# Patient Record
Sex: FEMALE | Race: OTHER | HISPANIC OR LATINO | URBAN - METROPOLITAN AREA
[De-identification: names, ages, dates, MRNs, and addresses within clinical notes are randomized per-mention and may not be internally consistent; named-entity substitution may affect disease eponyms.]

---

## 2020-02-07 ENCOUNTER — EMERGENCY (EMERGENCY)
Facility: HOSPITAL | Age: 30
LOS: 0 days | Discharge: HOME | End: 2020-02-07
Attending: EMERGENCY MEDICINE
Payer: COMMERCIAL

## 2020-02-07 VITALS — HEIGHT: 64 IN | WEIGHT: 160.06 LBS

## 2020-02-07 VITALS
SYSTOLIC BLOOD PRESSURE: 110 MMHG | RESPIRATION RATE: 18 BRPM | OXYGEN SATURATION: 99 % | TEMPERATURE: 98 F | HEART RATE: 65 BPM | DIASTOLIC BLOOD PRESSURE: 64 MMHG

## 2020-02-07 DIAGNOSIS — R11.10 VOMITING, UNSPECIFIED: ICD-10-CM

## 2020-02-07 DIAGNOSIS — R10.13 EPIGASTRIC PAIN: ICD-10-CM

## 2020-02-07 DIAGNOSIS — R11.2 NAUSEA WITH VOMITING, UNSPECIFIED: ICD-10-CM

## 2020-02-07 LAB
ALBUMIN SERPL ELPH-MCNC: 4.2 G/DL — SIGNIFICANT CHANGE UP (ref 3.3–5)
ALP SERPL-CCNC: 66 U/L — SIGNIFICANT CHANGE UP (ref 40–120)
ALT FLD-CCNC: 21 U/L — SIGNIFICANT CHANGE UP (ref 12–78)
ANION GAP SERPL CALC-SCNC: 7 MMOL/L — SIGNIFICANT CHANGE UP (ref 5–17)
AST SERPL-CCNC: 16 U/L — SIGNIFICANT CHANGE UP (ref 15–37)
BASOPHILS # BLD AUTO: 0.04 K/UL — SIGNIFICANT CHANGE UP (ref 0–0.2)
BASOPHILS NFR BLD AUTO: 0.4 % — SIGNIFICANT CHANGE UP (ref 0–2)
BILIRUB SERPL-MCNC: 0.3 MG/DL — SIGNIFICANT CHANGE UP (ref 0.2–1.2)
BUN SERPL-MCNC: 15 MG/DL — SIGNIFICANT CHANGE UP (ref 7–23)
CALCIUM SERPL-MCNC: 9.2 MG/DL — SIGNIFICANT CHANGE UP (ref 8.5–10.1)
CHLORIDE SERPL-SCNC: 107 MMOL/L — SIGNIFICANT CHANGE UP (ref 96–108)
CO2 SERPL-SCNC: 24 MMOL/L — SIGNIFICANT CHANGE UP (ref 22–31)
CREAT SERPL-MCNC: 0.93 MG/DL — SIGNIFICANT CHANGE UP (ref 0.5–1.3)
EOSINOPHIL # BLD AUTO: 0.27 K/UL — SIGNIFICANT CHANGE UP (ref 0–0.5)
EOSINOPHIL NFR BLD AUTO: 3 % — SIGNIFICANT CHANGE UP (ref 0–6)
GLUCOSE SERPL-MCNC: 78 MG/DL — SIGNIFICANT CHANGE UP (ref 70–99)
HCT VFR BLD CALC: 39 % — SIGNIFICANT CHANGE UP (ref 34.5–45)
HGB BLD-MCNC: 12.7 G/DL — SIGNIFICANT CHANGE UP (ref 11.5–15.5)
IMM GRANULOCYTES NFR BLD AUTO: 0.2 % — SIGNIFICANT CHANGE UP (ref 0–1.5)
LIDOCAIN IGE QN: 78 U/L — SIGNIFICANT CHANGE UP (ref 73–393)
LYMPHOCYTES # BLD AUTO: 3.69 K/UL — HIGH (ref 1–3.3)
LYMPHOCYTES # BLD AUTO: 40.3 % — SIGNIFICANT CHANGE UP (ref 13–44)
MCHC RBC-ENTMCNC: 29.1 PG — SIGNIFICANT CHANGE UP (ref 27–34)
MCHC RBC-ENTMCNC: 32.6 GM/DL — SIGNIFICANT CHANGE UP (ref 32–36)
MCV RBC AUTO: 89.4 FL — SIGNIFICANT CHANGE UP (ref 80–100)
MONOCYTES # BLD AUTO: 0.57 K/UL — SIGNIFICANT CHANGE UP (ref 0–0.9)
MONOCYTES NFR BLD AUTO: 6.2 % — SIGNIFICANT CHANGE UP (ref 2–14)
NEUTROPHILS # BLD AUTO: 4.56 K/UL — SIGNIFICANT CHANGE UP (ref 1.8–7.4)
NEUTROPHILS NFR BLD AUTO: 49.9 % — SIGNIFICANT CHANGE UP (ref 43–77)
PLATELET # BLD AUTO: 257 K/UL — SIGNIFICANT CHANGE UP (ref 150–400)
POTASSIUM SERPL-MCNC: 3.7 MMOL/L — SIGNIFICANT CHANGE UP (ref 3.5–5.3)
POTASSIUM SERPL-SCNC: 3.7 MMOL/L — SIGNIFICANT CHANGE UP (ref 3.5–5.3)
PROT SERPL-MCNC: 8 GM/DL — SIGNIFICANT CHANGE UP (ref 6–8.3)
RBC # BLD: 4.36 M/UL — SIGNIFICANT CHANGE UP (ref 3.8–5.2)
RBC # FLD: 13.5 % — SIGNIFICANT CHANGE UP (ref 10.3–14.5)
SODIUM SERPL-SCNC: 138 MMOL/L — SIGNIFICANT CHANGE UP (ref 135–145)
WBC # BLD: 9.15 K/UL — SIGNIFICANT CHANGE UP (ref 3.8–10.5)
WBC # FLD AUTO: 9.15 K/UL — SIGNIFICANT CHANGE UP (ref 3.8–10.5)

## 2020-02-07 PROCEDURE — 80053 COMPREHEN METABOLIC PANEL: CPT

## 2020-02-07 PROCEDURE — 99284 EMERGENCY DEPT VISIT MOD MDM: CPT | Mod: 25

## 2020-02-07 PROCEDURE — 96361 HYDRATE IV INFUSION ADD-ON: CPT

## 2020-02-07 PROCEDURE — 85025 COMPLETE CBC W/AUTO DIFF WBC: CPT

## 2020-02-07 PROCEDURE — 36415 COLL VENOUS BLD VENIPUNCTURE: CPT

## 2020-02-07 PROCEDURE — 96374 THER/PROPH/DIAG INJ IV PUSH: CPT

## 2020-02-07 PROCEDURE — 83690 ASSAY OF LIPASE: CPT

## 2020-02-07 PROCEDURE — 96375 TX/PRO/DX INJ NEW DRUG ADDON: CPT

## 2020-02-07 PROCEDURE — 99284 EMERGENCY DEPT VISIT MOD MDM: CPT

## 2020-02-07 RX ORDER — FAMOTIDINE 10 MG/ML
20 INJECTION INTRAVENOUS ONCE
Refills: 0 | Status: COMPLETED | OUTPATIENT
Start: 2020-02-07 | End: 2020-02-07

## 2020-02-07 RX ORDER — FAMOTIDINE 10 MG/ML
1 INJECTION INTRAVENOUS
Qty: 14 | Refills: 0
Start: 2020-02-07 | End: 2020-02-13

## 2020-02-07 RX ORDER — ONDANSETRON 8 MG/1
1 TABLET, FILM COATED ORAL
Qty: 9 | Refills: 0
Start: 2020-02-07 | End: 2020-02-09

## 2020-02-07 RX ORDER — ONDANSETRON 8 MG/1
4 TABLET, FILM COATED ORAL ONCE
Refills: 0 | Status: COMPLETED | OUTPATIENT
Start: 2020-02-07 | End: 2020-02-07

## 2020-02-07 RX ORDER — SODIUM CHLORIDE 9 MG/ML
1000 INJECTION INTRAMUSCULAR; INTRAVENOUS; SUBCUTANEOUS ONCE
Refills: 0 | Status: COMPLETED | OUTPATIENT
Start: 2020-02-07 | End: 2020-02-07

## 2020-02-07 RX ADMIN — ONDANSETRON 4 MILLIGRAM(S): 8 TABLET, FILM COATED ORAL at 20:01

## 2020-02-07 RX ADMIN — FAMOTIDINE 20 MILLIGRAM(S): 10 INJECTION INTRAVENOUS at 20:01

## 2020-02-07 RX ADMIN — SODIUM CHLORIDE 1000 MILLILITER(S): 9 INJECTION INTRAMUSCULAR; INTRAVENOUS; SUBCUTANEOUS at 19:22

## 2020-02-07 RX ADMIN — SODIUM CHLORIDE 1000 MILLILITER(S): 9 INJECTION INTRAMUSCULAR; INTRAVENOUS; SUBCUTANEOUS at 18:22

## 2020-02-07 NOTE — ED STATDOCS - PROGRESS NOTE DETAILS
29 y/o Female presents to ED c/o abd pains, vomiting today.  On exam, mild epigastric / RUQ tenderness to palp.  Neg rebound or guarding.  Will F/U Labs, re-eval. Britney Rossi PA-C Labs WNL.  Pt feeling better. Active Bs x4, Soft, NT/ND.  Will dc home with Pepcid, Zofran.  F/U with Phil. Britney Rossi PA-C

## 2020-02-07 NOTE — ED STATDOCS - PATIENT PORTAL LINK FT
You can access the FollowMyHealth Patient Portal offered by [facility] by registering at the following website: [email or web address]. By joining [employer]’s FollowMyHealth portal, you will also be able to view your health information using other applications (apps) compatible with our system.

## 2020-02-07 NOTE — ED STATDOCS - OBJECTIVE STATEMENT
29 y/o female with no pertinent PMHx, presents to the ED c/o vomiting, nausea, upper abd pain beginning today. Abd pain is exacerbated while vomiting. Denies fever or diarrhea. Patient primary language is Libyan. No other complaints at this time.

## 2020-02-07 NOTE — ED STATDOCS - CLINICAL SUMMARY MEDICAL DECISION MAKING FREE TEXT BOX
Pt with likely viral gastritis.  Treated symptomatically with improvement.  D/c home in good condition.

## 2020-02-07 NOTE — ED ADULT NURSE NOTE - OBJECTIVE STATEMENT
pt a&ox3, ambulatory. vss. complains of 5-6/10 abdominal pain associated w/ nausea and vomiting. denies fever, diarrhea, chills, chest pain and sob. denies difficulty and burning w/ urination. no distress. will monitor.

## 2020-05-22 PROBLEM — Z78.9 OTHER SPECIFIED HEALTH STATUS: Chronic | Status: ACTIVE | Noted: 2020-02-08

## 2020-05-26 ENCOUNTER — APPOINTMENT (OUTPATIENT)
Dept: ANTEPARTUM | Facility: CLINIC | Age: 30
End: 2020-05-26
Payer: COMMERCIAL

## 2020-05-26 ENCOUNTER — ASOB RESULT (OUTPATIENT)
Age: 30
End: 2020-05-26

## 2020-05-26 PROBLEM — Z00.00 ENCOUNTER FOR PREVENTIVE HEALTH EXAMINATION: Status: ACTIVE | Noted: 2020-05-26

## 2020-05-26 PROCEDURE — 76856 US EXAM PELVIC COMPLETE: CPT | Mod: 59

## 2020-05-26 PROCEDURE — 76830 TRANSVAGINAL US NON-OB: CPT

## 2020-10-08 ENCOUNTER — APPOINTMENT (OUTPATIENT)
Dept: ULTRASOUND IMAGING | Facility: CLINIC | Age: 30
End: 2020-10-08

## 2020-12-27 ENCOUNTER — OUTPATIENT (OUTPATIENT)
Dept: OUTPATIENT SERVICES | Facility: HOSPITAL | Age: 30
LOS: 1 days | End: 2020-12-27
Payer: COMMERCIAL

## 2020-12-27 ENCOUNTER — APPOINTMENT (OUTPATIENT)
Dept: ULTRASOUND IMAGING | Facility: CLINIC | Age: 30
End: 2020-12-27
Payer: COMMERCIAL

## 2020-12-27 DIAGNOSIS — Z00.8 ENCOUNTER FOR OTHER GENERAL EXAMINATION: ICD-10-CM

## 2020-12-27 PROCEDURE — 76700 US EXAM ABDOM COMPLETE: CPT

## 2020-12-27 PROCEDURE — 76700 US EXAM ABDOM COMPLETE: CPT | Mod: 26

## 2021-03-29 ENCOUNTER — EMERGENCY (EMERGENCY)
Facility: HOSPITAL | Age: 31
LOS: 0 days | Discharge: HOME | End: 2021-03-29
Attending: EMERGENCY MEDICINE
Payer: COMMERCIAL

## 2021-03-29 VITALS
RESPIRATION RATE: 18 BRPM | TEMPERATURE: 99 F | SYSTOLIC BLOOD PRESSURE: 128 MMHG | OXYGEN SATURATION: 100 % | DIASTOLIC BLOOD PRESSURE: 78 MMHG | HEART RATE: 90 BPM

## 2021-03-29 VITALS
SYSTOLIC BLOOD PRESSURE: 122 MMHG | RESPIRATION RATE: 19 BRPM | OXYGEN SATURATION: 100 % | DIASTOLIC BLOOD PRESSURE: 80 MMHG | TEMPERATURE: 99 F | HEART RATE: 94 BPM

## 2021-03-29 DIAGNOSIS — R51.9 HEADACHE, UNSPECIFIED: ICD-10-CM

## 2021-03-29 DIAGNOSIS — R11.0 NAUSEA: ICD-10-CM

## 2021-03-29 DIAGNOSIS — Z11.3 ENCOUNTER FOR SCREENING FOR INFECTIONS WITH A PREDOMINANTLY SEXUAL MODE OF TRANSMISSION: ICD-10-CM

## 2021-03-29 DIAGNOSIS — Z11.4 ENCOUNTER FOR SCREENING FOR HUMAN IMMUNODEFICIENCY VIRUS [HIV]: ICD-10-CM

## 2021-03-29 DIAGNOSIS — R10.9 UNSPECIFIED ABDOMINAL PAIN: ICD-10-CM

## 2021-03-29 DIAGNOSIS — K29.70 GASTRITIS, UNSPECIFIED, WITHOUT BLEEDING: ICD-10-CM

## 2021-03-29 LAB
ALBUMIN SERPL ELPH-MCNC: 4.5 G/DL — SIGNIFICANT CHANGE UP (ref 3.3–5)
ALP SERPL-CCNC: 71 U/L — SIGNIFICANT CHANGE UP (ref 40–120)
ALT FLD-CCNC: 20 U/L — SIGNIFICANT CHANGE UP (ref 12–78)
ANION GAP SERPL CALC-SCNC: 5 MMOL/L — SIGNIFICANT CHANGE UP (ref 5–17)
APPEARANCE UR: CLEAR — SIGNIFICANT CHANGE UP
AST SERPL-CCNC: 10 U/L — LOW (ref 15–37)
BASOPHILS # BLD AUTO: 0.03 K/UL — SIGNIFICANT CHANGE UP (ref 0–0.2)
BASOPHILS NFR BLD AUTO: 0.3 % — SIGNIFICANT CHANGE UP (ref 0–2)
BILIRUB SERPL-MCNC: 0.4 MG/DL — SIGNIFICANT CHANGE UP (ref 0.2–1.2)
BILIRUB UR-MCNC: NEGATIVE — SIGNIFICANT CHANGE UP
BUN SERPL-MCNC: 12 MG/DL — SIGNIFICANT CHANGE UP (ref 7–23)
CALCIUM SERPL-MCNC: 9.6 MG/DL — SIGNIFICANT CHANGE UP (ref 8.5–10.1)
CHLORIDE SERPL-SCNC: 105 MMOL/L — SIGNIFICANT CHANGE UP (ref 96–108)
CO2 SERPL-SCNC: 28 MMOL/L — SIGNIFICANT CHANGE UP (ref 22–31)
COLOR SPEC: YELLOW — SIGNIFICANT CHANGE UP
CREAT SERPL-MCNC: 0.89 MG/DL — SIGNIFICANT CHANGE UP (ref 0.5–1.3)
DIFF PNL FLD: NEGATIVE — SIGNIFICANT CHANGE UP
EOSINOPHIL # BLD AUTO: 0.03 K/UL — SIGNIFICANT CHANGE UP (ref 0–0.5)
EOSINOPHIL NFR BLD AUTO: 0.3 % — SIGNIFICANT CHANGE UP (ref 0–6)
GLUCOSE SERPL-MCNC: 103 MG/DL — HIGH (ref 70–99)
GLUCOSE UR QL: NEGATIVE MG/DL — SIGNIFICANT CHANGE UP
HCT VFR BLD CALC: 40.1 % — SIGNIFICANT CHANGE UP (ref 34.5–45)
HGB BLD-MCNC: 13 G/DL — SIGNIFICANT CHANGE UP (ref 11.5–15.5)
HIV 1 & 2 AB SERPL IA.RAPID: SIGNIFICANT CHANGE UP
IMM GRANULOCYTES NFR BLD AUTO: 0.3 % — SIGNIFICANT CHANGE UP (ref 0–1.5)
KETONES UR-MCNC: NEGATIVE — SIGNIFICANT CHANGE UP
LEUKOCYTE ESTERASE UR-ACNC: NEGATIVE — SIGNIFICANT CHANGE UP
LIDOCAIN IGE QN: 70 U/L — LOW (ref 73–393)
LYMPHOCYTES # BLD AUTO: 2.14 K/UL — SIGNIFICANT CHANGE UP (ref 1–3.3)
LYMPHOCYTES # BLD AUTO: 22.9 % — SIGNIFICANT CHANGE UP (ref 13–44)
MCHC RBC-ENTMCNC: 29.1 PG — SIGNIFICANT CHANGE UP (ref 27–34)
MCHC RBC-ENTMCNC: 32.4 GM/DL — SIGNIFICANT CHANGE UP (ref 32–36)
MCV RBC AUTO: 89.9 FL — SIGNIFICANT CHANGE UP (ref 80–100)
MONOCYTES # BLD AUTO: 0.39 K/UL — SIGNIFICANT CHANGE UP (ref 0–0.9)
MONOCYTES NFR BLD AUTO: 4.2 % — SIGNIFICANT CHANGE UP (ref 2–14)
NEUTROPHILS # BLD AUTO: 6.72 K/UL — SIGNIFICANT CHANGE UP (ref 1.8–7.4)
NEUTROPHILS NFR BLD AUTO: 72 % — SIGNIFICANT CHANGE UP (ref 43–77)
NITRITE UR-MCNC: NEGATIVE — SIGNIFICANT CHANGE UP
PH UR: 7 — SIGNIFICANT CHANGE UP (ref 5–8)
PLATELET # BLD AUTO: 259 K/UL — SIGNIFICANT CHANGE UP (ref 150–400)
POTASSIUM SERPL-MCNC: 3.6 MMOL/L — SIGNIFICANT CHANGE UP (ref 3.5–5.3)
POTASSIUM SERPL-SCNC: 3.6 MMOL/L — SIGNIFICANT CHANGE UP (ref 3.5–5.3)
PROT SERPL-MCNC: 8.3 GM/DL — SIGNIFICANT CHANGE UP (ref 6–8.3)
PROT UR-MCNC: NEGATIVE MG/DL — SIGNIFICANT CHANGE UP
RBC # BLD: 4.46 M/UL — SIGNIFICANT CHANGE UP (ref 3.8–5.2)
RBC # FLD: 13.3 % — SIGNIFICANT CHANGE UP (ref 10.3–14.5)
SODIUM SERPL-SCNC: 138 MMOL/L — SIGNIFICANT CHANGE UP (ref 135–145)
SP GR SPEC: 1 — LOW (ref 1.01–1.02)
UROBILINOGEN FLD QL: NEGATIVE MG/DL — SIGNIFICANT CHANGE UP
WBC # BLD: 9.34 K/UL — SIGNIFICANT CHANGE UP (ref 3.8–10.5)
WBC # FLD AUTO: 9.34 K/UL — SIGNIFICANT CHANGE UP (ref 3.8–10.5)

## 2021-03-29 PROCEDURE — 86780 TREPONEMA PALLIDUM: CPT

## 2021-03-29 PROCEDURE — 99284 EMERGENCY DEPT VISIT MOD MDM: CPT

## 2021-03-29 PROCEDURE — 83690 ASSAY OF LIPASE: CPT

## 2021-03-29 PROCEDURE — 81025 URINE PREGNANCY TEST: CPT

## 2021-03-29 PROCEDURE — 96375 TX/PRO/DX INJ NEW DRUG ADDON: CPT

## 2021-03-29 PROCEDURE — 86703 HIV-1/HIV-2 1 RESULT ANTBDY: CPT

## 2021-03-29 PROCEDURE — 96374 THER/PROPH/DIAG INJ IV PUSH: CPT

## 2021-03-29 PROCEDURE — 80053 COMPREHEN METABOLIC PANEL: CPT

## 2021-03-29 PROCEDURE — 99284 EMERGENCY DEPT VISIT MOD MDM: CPT | Mod: 25

## 2021-03-29 PROCEDURE — 87591 N.GONORRHOEAE DNA AMP PROB: CPT

## 2021-03-29 PROCEDURE — 87491 CHLMYD TRACH DNA AMP PROBE: CPT

## 2021-03-29 PROCEDURE — 87086 URINE CULTURE/COLONY COUNT: CPT

## 2021-03-29 PROCEDURE — 85025 COMPLETE CBC W/AUTO DIFF WBC: CPT

## 2021-03-29 PROCEDURE — 81003 URINALYSIS AUTO W/O SCOPE: CPT

## 2021-03-29 PROCEDURE — 36415 COLL VENOUS BLD VENIPUNCTURE: CPT

## 2021-03-29 RX ORDER — FAMOTIDINE 10 MG/ML
20 INJECTION INTRAVENOUS ONCE
Refills: 0 | Status: COMPLETED | OUTPATIENT
Start: 2021-03-29 | End: 2021-03-29

## 2021-03-29 RX ORDER — SODIUM CHLORIDE 9 MG/ML
1000 INJECTION INTRAMUSCULAR; INTRAVENOUS; SUBCUTANEOUS ONCE
Refills: 0 | Status: COMPLETED | OUTPATIENT
Start: 2021-03-29 | End: 2021-03-29

## 2021-03-29 RX ORDER — ONDANSETRON 8 MG/1
4 TABLET, FILM COATED ORAL ONCE
Refills: 0 | Status: COMPLETED | OUTPATIENT
Start: 2021-03-29 | End: 2021-03-29

## 2021-03-29 RX ADMIN — ONDANSETRON 4 MILLIGRAM(S): 8 TABLET, FILM COATED ORAL at 19:51

## 2021-03-29 RX ADMIN — SODIUM CHLORIDE 1000 MILLILITER(S): 9 INJECTION INTRAMUSCULAR; INTRAVENOUS; SUBCUTANEOUS at 19:51

## 2021-03-29 RX ADMIN — FAMOTIDINE 20 MILLIGRAM(S): 10 INJECTION INTRAVENOUS at 19:51

## 2021-03-29 NOTE — ED STATDOCS - CLINICAL SUMMARY MEDICAL DECISION MAKING FREE TEXT BOX
Labs WNL.  Pt feeling better after supportive care.  Likely gastritis.  D/c home in good condition, f/u with PCP.

## 2021-03-29 NOTE — ED STATDOCS - OBJECTIVE STATEMENT
Pt speaks Syrian,  ID: [id number]. 30 y/o female with no pertinent PMHx presents to the ED c/o abd pain with associated nausea since yesterday. Pt describes abd pain as a burning sensation and also reports associated headache. LMP 2/17. Denies vomiting, diarrhea, fevers, chills, SOB, chest pain. No other complaints at this time.

## 2021-03-29 NOTE — ED STATDOCS - PROGRESS NOTE DETAILS
Feeling better after medications. Waiting for results of UA. Ehsan TY Reviewed results of Lab work and UA with patient. Agreed to F/U with Dr. Oscar at the [facility]. Return instructions given for worsening symptoms. Ehsan NP

## 2021-03-29 NOTE — ED STATDOCS - CARE PROVIDER_API CALL
Rosana Oscar)  Family Medicine  [address] [place] [address]  Phone: [phone]  Fax: [phone]  Follow Up Time:

## 2021-03-29 NOTE — ED STATDOCS - NSFOLLOWUPINSTRUCTIONS_ED_ALL_ED_FT
GASTRITIS - Discharge Care           Gastritis    LO QUE NECESITA SABER:    La gastritis es lg inflamación o irritación del revestimiento del estómago.     Órganos abdominales         INSTRUCCIONES SOBRE EL JERONIMO HOSPITALARIA:    Llame al 911 en sarah de presentar lo siguiente:  •Tiene dolor de pecho o le falta el aire.          Busque atención médica de inmediato si:  •Usted vomita snow.      •Usted tiene evacuaciones intestinales negras o con snow.      •Usted tiene un hyacinth dolor de estómago o de espalda.      Comuníquese con lilly médico si:  •Tiene fiebre.      •Usted tiene síntomas nuevos o estos empeoran, aun después del tratamiento.      •Usted tiene preguntas o inquietudes acerca de lilly condición o cuidado.      Medicamentos:  •Los medicamentospara ayudar a tratar la infección bacteriana o para disminuir el ácido estomacal.      •[place] teri medicamentos jimenez se le haya indicado.Consulte con lilly médico si usted alirio que lilly medicamento no le está ayudando o si presenta efectos secundarios. Infórmele si es alérgico a cualquier medicamento. Mantenga lg lista actualizada de los medicamentos, las vitaminas y los productos herbales que destiny. Incluya los siguientes datos de los medicamentos: cantidad, frecuencia y motivo de administración. Traiga con usted la lista o los envases de las píldoras a teri citas de seguimiento. Lleve la lista de los medicamentos con usted en sarah de lg emergencia.      Maneje o evite la gastritis:  •No fume.La nicotina y otras sustancias químicas de los cigarrillos y los cigarros pueden empeorar teri síntomas y causar daño pulmonar. Pida información a lilly médico si usted actualmente fuma y necesita ayuda para dejar de fumar. Los cigarrillos electrónicos o el tabaco sin humo igualmente contienen nicotina. Consulte con lilly médico antes de utilizar estos productos.      •No consuma alcohol.El alcohol puede evitar la cicatrización y empeorar la gastritis. Consulte con lilly médico si usted necesita ayuda para dejar de cierra alcohol.      •No tome medicamentos SIMRAN o aspirina a menos que así se lo indiquen.Estos analgésicos y medicamentos similares pueden causar irritación. Si lilly médico lo autoriza a cierra medicamentos SIMRAN, tómelos con la comida.      •No coma alimentos que le provocan irritación:Los alimentos jimenez las naranjas y la salsa pueden causar ardor o dolor. Consuma alimentos saludables y variados. Unos ejemplos incluyen las frutas (no las cítricas), verduras, productos lácteos descremados, legumbres, pan integral al igual que las adriana magras y pescado. Trate de comer porciones más pequeñas y cierra agua con teri comidas. No coma nada al menos por 3 horas antes de acostarse.      •Encuentre maneras de relajarse y reducir el estrés.El estrés puede aumentar el ácido estomacal y empeorar la gastritis. Las actividades jimenez el yoga, la meditación o el escuchar música pueden ayudarlo a relajarse. Pase tiempo con amigos, o marbin cosas que disfruta.      Acuda a teri consultas de control con lilly médico según le indicaron.Puede que necesite exámenes o tratamiento continuos o derivación a un gastroenterólogo. Anote teri preguntas para que se acuerde de hacerlas elmer teri visitas. GASTRITIS - Discharge Care           Gastritis    LO QUE NECESITA SABER:    La gastritis es lg inflamación o irritación del revestimiento del estómago.     Órganos abdominales         INSTRUCCIONES SOBRE EL J[facility]ONIMO HOSPITALARIA:    Llame al 911 en sarah de presentar lo siguiente:  •Tiene dolor de pecho o le falta el aire.          Busque atención médica de inmediato si:  •Usted vomita snow.      •Usted tiene evacuaciones intestinales negras o con snow.      •Usted tiene un hyacinth dolor de estómago o de espalda.      Comuníquese con lilly médico si:  •Tiene fiebre.      •Usted tiene síntomas nuevos o estos empeoran, aun después del tratamiento.      •Usted tiene preguntas o inquietudes acerca de lilly condición o cuidado.      Medicamentos:  •Los medicamentospara ayudar a tratar la infección bacteriana o para disminuir el ácido estomacal.      •[place] teri medicamentos jimenez se le haya indicado.Consulte con lilly médico si usted alirio que lilly medicamento no le está ayudando o si presenta efectos secundarios. Infórmele si es alérgico a cualquier medicamento. Mantenga lg lista actualizada de los medicamentos, las vitaminas y los productos herbales que destiny. Incluya los siguientes datos de los medicamentos: cantidad, frecuencia y motivo de administración. Traiga con usted la lista o los envases de las píldoras a teri citas de seguimiento. Lleve la lista de los medicamentos con usted en sarah de lg emergencia.      Maneje o evite la gastritis:  •No fume.La nicotina y otras sustancias químicas de los cigarrillos y los cigarros pueden empeorar teri síntomas y causar daño pulmonar. Pida información a lilly médico si usted actualmente fuma y necesita ayuda para dejar de fumar. Los cigarrillos electrónicos o el tabaco sin humo igualmente contienen nicotina. Consulte con lilly médico antes de utilizar estos productos.      •No consuma alcohol.El alcohol puede evitar la cicatrización y empeorar la gastritis. Consulte con lilly médico si usted necesita ayuda para dejar de cierra alcohol.      •No tome medicamentos SIMRAN o aspirina a menos que así se lo indiquen.Estos analgésicos y medicamentos similares pueden causar irritación. Si lilly médico lo autoriza a cierra medicamentos SIMRAN, tómelos con la comida.      •No coma alimentos que le provocan irritación:Los alimentos jimenez las naranjas y la salsa pueden causar ardor o dolor. Consuma alimentos saludables y variados. Unos ejemplos incluyen las frutas (no las cítricas), verduras, productos lácteos descremados, legumbres, pan integral al igual que las adriana magras y pescado. Trate de comer porciones más pequeñas y cierra agua con teri comidas. No coma nada al menos por 3 horas antes de acostarse.      •Encuentre maneras de relajarse y reducir el estrés.El estrés puede aumentar el ácido estomacal y empeorar la gastritis. Las actividades jimenez el yoga, la meditación o el escuchar música pueden ayudarlo a relajarse. Pase tiempo con amigos, o marbin cosas que disfruta.      Acuda a teri consultas de control con lilly médico según le indicaron.Puede que necesite exámenes o tratamiento continuos o derivación a un gastroenterólogo. Anote teri preguntas para que se acuerde de hacerlas elmer teri visitas.    Rest. Drink plenty of fluids.   Avoid fried and spicy foods.  Take Pepcid as directed,  Follow up with Dr. Oscar.

## 2021-03-29 NOTE — ED ADULT NURSE NOTE - OBJECTIVE STATEMENT
pt abd pain, nausea and vomiting x1 day s/p finding out  of 1 year has been cheating on her. Feels distressed and shaky after this revelation. denies chest pain/sob. will ctm

## 2021-03-29 NOTE — ED STATDOCS - PATIENT PORTAL LINK FT
You can access the FollowMyHealth Patient Portal offered by [facility] by registering at the following website: [email or web address]. By joining [employer]’s FollowMyHealth portal, you will also be able to view your health information using other applications (apps) compatible with our system.

## 2021-03-30 LAB — T PALLIDUM AB TITR SER: NEGATIVE — SIGNIFICANT CHANGE UP

## 2021-03-31 LAB
CULTURE RESULTS: NO GROWTH — SIGNIFICANT CHANGE UP
SPECIMEN SOURCE: SIGNIFICANT CHANGE UP

## 2021-05-06 NOTE — ED STATDOCS - NSFOLLOWUPCLINICS_GEN_ALL_ED_FT
Discharged [facility]  Family Medicine  [address] [place] [address]  Phone: [phone]  Fax:   Follow Up Time:

## 2021-06-09 ENCOUNTER — EMERGENCY (EMERGENCY)
Facility: HOSPITAL | Age: 31
LOS: 0 days | Discharge: HOME | End: 2021-06-09
Attending: EMERGENCY MEDICINE
Payer: COMMERCIAL

## 2021-06-09 VITALS
HEART RATE: 81 BPM | RESPIRATION RATE: 18 BRPM | OXYGEN SATURATION: 100 % | TEMPERATURE: 100 F | DIASTOLIC BLOOD PRESSURE: 77 MMHG | SYSTOLIC BLOOD PRESSURE: 120 MMHG

## 2021-06-09 VITALS — WEIGHT: 179.9 LBS | HEIGHT: 64 IN

## 2021-06-09 DIAGNOSIS — M79.671 PAIN IN RIGHT FOOT: ICD-10-CM
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
## 2021-06-09 DIAGNOSIS — S93.401A SPRAIN OF UNSPECIFIED LIGAMENT OF RIGHT ANKLE, INITIAL ENCOUNTER: ICD-10-CM
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
## 2021-06-09 DIAGNOSIS — M25.571 PAIN IN RIGHT ANKLE AND JOINTS OF RIGHT FOOT: ICD-10-CM

## 2021-06-09 PROCEDURE — 73630 X-RAY EXAM OF FOOT: CPT | Mod: 26,RT

## 2021-06-09 PROCEDURE — 73610 X-RAY EXAM OF ANKLE: CPT | Mod: RT

## 2021-06-09 PROCEDURE — 99283 EMERGENCY DEPT VISIT LOW MDM: CPT

## 2021-06-09 PROCEDURE — 73630 X-RAY EXAM OF FOOT: CPT | Mod: RT

## 2021-06-09 PROCEDURE — 73610 X-RAY EXAM OF ANKLE: CPT | Mod: 26,RT

## 2021-06-09 PROCEDURE — 99284 EMERGENCY DEPT VISIT MOD MDM: CPT | Mod: 25

## 2021-06-09 RX ORDER — IBUPROFEN 200 MG
600 TABLET ORAL ONCE
Refills: 0 | Status: COMPLETED | OUTPATIENT
Start: 2021-06-09 | End: 2021-06-09

## 2021-06-09 RX ADMIN — Medication 600 MILLIGRAM(S): at 17:07

## 2021-06-09 NOTE — ED STATDOCS - PATIENT PORTAL LINK FT
You can access the FollowMyHealth Patient Portal offered by [facility] by registering at the following website: [email or web address]. By joining [employer]’s FollowMyHealth portal, you will also be able to view your health information using other applications (apps) compatible with our system.

## 2021-06-09 NOTE — ED STATDOCS - CLINICAL SUMMARY MEDICAL DECISION MAKING FREE TEXT BOX
Right foot and ankle pain x1 month. Likely from strain vs. sprain. X-rays at  have been negative, but with increasing pain. Normal gait.

## 2021-06-09 NOTE — ED STATDOCS - PHYSICAL EXAMINATION
*GEN: No acute distress, well appearing   *HEAD: Normocephalic, Atraumatic  *EYES/NOSE: b/l Pupils symmetric & Reactive to light, EOMI b/l  *THROAT: airway patent, moist mucous membranes  *NECK: Neck supple  *PULMONARY: No Respiratory distress, symmetric b/l chest rise  *CARDIAC: s1s2, regular rhythm   *ABDOMEN:  Non Tender, Non Distended, soft, no guarding, no rebound, no masses   *BACK: no CVA tenderness, No midline vertebral tenderness to palpation   *EXTREMITIES: symmetric pulses, 2+ DP & radial pulses, no cyanosis, no edema   *SKIN: no rash, no bruising   *NEUROLOGIC: alert,  full active & passive ROM in all 4 extremities, normal gait.  *PSYCH: appropriate concern about symptoms, pleasant

## 2021-06-09 NOTE — ED STATDOCS - OBJECTIVE STATEMENT
Pertinent HPI/PMH/PSH/FHx/SHx and Review of Systems contained within  HPI:  Patient p/w right ankle/foot pain x  3 weeks, new onset. Pt states on May 21st, rolled her right ankle at work and has been with pain since then. Went to , x-rays were found to be normal, but was not given pain medication. Returned to work 2 days ago, but was told she needed a doctor's note to return. Has appointment with PMD later in the month for ortho referral. Has been taking Ibuprofen at home to manage pain. NKDA.  [id number] used to translate Urdu to English.   PMH/PSH relevant for: Metformin for DM prophylaxis  ROS negative for: fever, Chest pain, SOB, Nausea, vomiting, diarrhea, abdominal pain, dysuria    FamilyHx and SocialHx not otherwise contributory

## 2021-06-09 NOTE — ED STATDOCS - PROGRESS NOTE DETAILS
32 y/o Korean speaking Female used  [id number] to re-eval.  Pt c/o continued pain to R inner aspect of ankle sp twisting it 3-4 weeks ago.  No new injury.  Hurts with standing on foot.  On xrays, neg obvious fx.  On exam, neg ecchymosis.  ? mild swelling to lateral malleoli.  Tender medial malleoli to palp.   Full PROM.  NT foot.  2+ pedal pulses.  NVI Le.  Applied ACE and air cast.  placed sticker in Referral book for Ortho f/u.  Recommend cont Ibuprofen  Tylenol , RICE.  Britney Rossi PA-C

## 2021-06-09 NOTE — ED STATDOCS - ATTENDING CONTRIBUTION TO CARE
I, Matthias Horton MD,  performed the initial face to face bedside interview with this patient regarding history of present illness, review of symptoms and relevant past medical, social and family history.  I completed an independent physical examination.  I was the initial provider who evaluated this patient. I have signed out the follow up of any pending tests (i.e. labs, radiological studies) to the ACP.  The ACP will complete the work up, interpret tests, administer medications if necessary and reassess the patient for an appropriate disposition.  If questions arise the ACP is expected to contact me for consultation. In the current work-flow I usually don't get to speak to nor reassess patients for final disposition once I sign the case out to the ACP (Unless otherwise specifically documented by me).

## 2021-06-09 NOTE — ED STATDOCS - CARE PROVIDER_API CALL
Satnam Hylton)  Orthopaedic Surgery  [address], [address]  [place] [address]  Phone: [phone]  Fax: [phone]  Follow Up Time:

## 2021-06-09 NOTE — ED STATDOCS - PROVIDER TOKENS
PROVIDER:[TOKEN:[[id number]]] History of cardiac cath  in 2008 and 2009. total of 6 stents. No cards present  S/P colonoscopy  every 2 years  S/P ORIF (open reduction internal fixation) fracture  of right ring finger

## 2021-06-09 NOTE — ED STATDOCS - NS ED ROS FT
Review of Systems:  	•	CONSTITUTIONAL: no fever  	•	SKIN: no rash  	•	RESPIRATORY: no shortness of breath  	•	CARDIAC: no chest pain  	•	GI:  no abd pain, no nausea, no vomiting, no diarrhea  	•	GENITO-URINARY:  no dysuria  	•	MUSCULOSKELETAL:  no back pain. +right foot/ankle pain  	•	NEUROLOGIC: no weakness  	•	ALLERGY: no rhinorrhea  	•	PSYSCHIATRIC: appropriate concern about symptoms

## 2021-06-14 ENCOUNTER — APPOINTMENT (OUTPATIENT)
Dept: ORTHOPEDIC SURGERY | Facility: CLINIC | Age: 31
End: 2021-06-14

## 2021-10-26 ENCOUNTER — APPOINTMENT (OUTPATIENT)
Dept: ANTEPARTUM | Facility: CLINIC | Age: 31
End: 2021-10-26
Payer: COMMERCIAL

## 2021-10-26 ENCOUNTER — ASOB RESULT (OUTPATIENT)
Age: 31
End: 2021-10-26

## 2021-10-26 PROCEDURE — 76857 US EXAM PELVIC LIMITED: CPT | Mod: 59

## 2021-10-26 PROCEDURE — 76830 TRANSVAGINAL US NON-OB: CPT

## 2024-10-31 NOTE — ED STATDOCS - NS_ATTENDINGSCRIBE_ED_ALL_ED
PAST SURGICAL HISTORY:  H/O endoscopy     History of D&C     History of endometrial biopsy     History of hysteroscopy     S/P breast biopsy     
I personally performed the service described in the documentation recorded by the scribe in my presence, and it accurately and completely records my words and actions.

## 2025-01-29 NOTE — ED STATDOCS - CHIEF COMPLAINT
January 29, 2025     Patient: Daljit Medeiros  YOB: 2009  Date of Visit: 1/29/2025      To Whom it May Concern:    Daljit Medeiros is under my professional care. Daljit was seen in my office on 1/29/2025. Daljit may return to school on 1/31/2025  and may return to work on 1/31/2025 .    If you have any questions or concerns, please don't hesitate to call.         Sincerely,          Soni Story MD        CC: No Recipients  
The patient is a 31y year old Female complaining of abdominal pain.